# Patient Record
Sex: FEMALE | Race: WHITE | NOT HISPANIC OR LATINO | Employment: FULL TIME | URBAN - METROPOLITAN AREA
[De-identification: names, ages, dates, MRNs, and addresses within clinical notes are randomized per-mention and may not be internally consistent; named-entity substitution may affect disease eponyms.]

---

## 2020-07-02 ENCOUNTER — OFFICE VISIT (OUTPATIENT)
Dept: NEUROSURGERY | Facility: CLINIC | Age: 35
End: 2020-07-02

## 2020-07-02 VITALS
SYSTOLIC BLOOD PRESSURE: 116 MMHG | DIASTOLIC BLOOD PRESSURE: 76 MMHG | WEIGHT: 142 LBS | TEMPERATURE: 98.2 F | HEIGHT: 63 IN | BODY MASS INDEX: 25.16 KG/M2

## 2020-07-02 DIAGNOSIS — M54.42 CHRONIC LEFT-SIDED LOW BACK PAIN WITH LEFT-SIDED SCIATICA: Primary | ICD-10-CM

## 2020-07-02 DIAGNOSIS — G89.29 CHRONIC LEFT-SIDED LOW BACK PAIN WITH LEFT-SIDED SCIATICA: Primary | ICD-10-CM

## 2020-07-02 DIAGNOSIS — Z98.890 S/P DISCECTOMY: ICD-10-CM

## 2020-07-02 PROCEDURE — 99213 OFFICE O/P EST LOW 20 MIN: CPT | Performed by: PHYSICIAN ASSISTANT

## 2020-07-02 RX ORDER — BUPROPION HYDROCHLORIDE 150 MG/1
TABLET ORAL
COMMUNITY
Start: 2020-06-17

## 2020-07-02 RX ORDER — TIZANIDINE 4 MG/1
TABLET ORAL
COMMUNITY
Start: 2020-06-18

## 2020-07-02 RX ORDER — BUSPIRONE HYDROCHLORIDE 15 MG/1
TABLET ORAL
COMMUNITY
Start: 2020-06-08

## 2020-07-02 RX ORDER — MONTELUKAST SODIUM 10 MG/1
TABLET ORAL
COMMUNITY
Start: 2020-06-08

## 2020-07-02 RX ORDER — QUETIAPINE FUMARATE 25 MG/1
TABLET, FILM COATED ORAL
COMMUNITY
Start: 2020-06-21

## 2020-07-02 RX ORDER — METHYLPREDNISOLONE 4 MG/1
TABLET ORAL
Qty: 21 TABLET | Refills: 0 | Status: SHIPPED | OUTPATIENT
Start: 2020-07-02 | End: 2022-07-21

## 2020-07-02 RX ORDER — FLUOXETINE HYDROCHLORIDE 20 MG/1
40 CAPSULE ORAL DAILY
COMMUNITY

## 2020-07-02 NOTE — PATIENT INSTRUCTIONS

## 2020-07-02 NOTE — PROGRESS NOTES
NAME: CRUZ GUTIERREZ   DOS: 2020  : 1985  PCP: Marianne Ruth APRN    Chief Complaint:  Back Pain      History of Present Illness: Ms. Gutierrez is a 35 y.o. female who is a  is seen today with a chief complaint of left hip and posterior thigh pain.  She has a significant past medical history of a discectomy in  by Dr. Pinon.  She notes that her symptoms are similar this time, however, not as severe as prior.  She states the pain has been present for the last several years but have worsened over the last few months.  She describes the pain as constant and throbbing.  She has not completed any physical therapy.  Patient is a current smoker 1 pack/day for the last 13-14 years.  Patient has attempted muscle relaxer without relief and takes ibuprofen sporadically.  Patient denies any bowel or bladder incontinence, saddle anesthesia, or overt weakness.      Past Medical History:   Diagnosis Date   • Arthritis        Past Surgical History:   Procedure Laterality Date   • BACK SURGERY  2018    ruptured disc/[place]/ sitting on nerve root.   • SHOULDER ARTHROSCOPY               Review of Systems   Constitutional: Positive for activity change.   Eyes: Positive for discharge and itching.   Musculoskeletal: Positive for arthralgias, back pain, myalgias and neck pain.   Allergic/Immunologic: Positive for food allergies.   Neurological: Positive for weakness, numbness and headaches.   Psychiatric/Behavioral: Positive for dysphoric mood and sleep disturbance.   All other systems reviewed and are negative.       Medications:    Current Outpatient Medications:   •  FLUoxetine (PROzac) 20 MG capsule, Take 40 mg by mouth Daily., Disp: , Rfl:   •  buPROPion XL (WELLBUTRIN XL) 150 MG 24 hr tablet, , Disp: , Rfl:   •  busPIRone (BUSPAR) 15 MG tablet, , Disp: , Rfl:   •  methylPREDNISolone (MEDROL, FARIDA,) 4 MG tablet, Take as directed on package instructions., Disp: 21 tablet, Rfl: 0  •  montelukast  (SINGULAIR) 10 MG tablet, , Disp: , Rfl:   •  QUEtiapine (SEROquel) 25 MG tablet, , Disp: , Rfl:   •  SPRINTEC 28 0.25-35 MG-MCG per tablet, , Disp: , Rfl:   •  tiZANidine (ZANAFLEX) 4 MG tablet, , Disp: , Rfl:     Allergies:  Allergies   Allergen Reactions   • Delsym [Dextromethorphan Polistirex Er] GI Intolerance   • Red Dye GI Intolerance   • Sulfa Antibiotics GI Intolerance       Social History     Tobacco Use   • Smoking status: Current Every Day Smoker     Packs/day: 1.00   • Smokeless tobacco: Never Used   Substance Use Topics   • Alcohol use: Not on file     Comment: rare   • Drug use: Never       History reviewed. No pertinent family history.    Review of Imaging:  MRI of the lumbar spine without contrast was reviewed along with its corresponding radiology report.  The study is limited due to a prior surgery and was not completed with contrast.  The study demonstrates a small left sided subarticular extrusion at the L5-S1, moderate canal stenosis at L4-5.  There is also evidence of degenerative disc changes at L4-5 and L5-S1.  No evidence of fracture or misalignment.    Vitals:    07/02/20 1317   BP: 116/76   Temp: 98.2 °F (36.8 °C)     Body mass index is 25.15 kg/m².    Physical Exam   Constitutional: She is oriented to person, place, and time. She appears well-developed and well-nourished.   Eyes: Conjunctivae and EOM are normal.   Cardiovascular: Normal rate.   No murmur heard.  Musculoskeletal: Normal range of motion.   Neurological: She is oriented to person, place, and time. She has normal strength. Gait normal.   Reflex Scores:       Tricep reflexes are 2+ on the right side and 2+ on the left side.       Bicep reflexes are 2+ on the right side and 2+ on the left side.       Patellar reflexes are 2+ on the right side and 2+ on the left side.       Achilles reflexes are 2+ on the right side and 2+ on the left side.  Skin: Skin is warm and dry.     Neurologic Exam     Mental Status   Oriented to person,  place, and time.     Cranial Nerves     CN III, IV, VI   Extraocular motions are normal.     Motor Exam   Muscle bulk: normal  Overall muscle tone: normal  Right arm tone: normal  Left arm tone: normal  Right leg tone: normal  Left leg tone: normal    Strength   Strength 5/5 throughout. Hip Rotation L/R Positive      Sensory Exam   Light touch normal.     Gait, Coordination, and Reflexes     Gait  Gait: normal    Reflexes   Right biceps: 2+  Left biceps: 2+  Right triceps: 2+  Left triceps: 2+  Right patellar: 2+  Left patellar: 2+  Right achilles: 2+  Left achilles: 2+  Right Earl: absent  Left Earl: absent  Right ankle clonus: absent  Left ankle clonus: absent      Diagnoses/Plan:    Ms. Carlos is a 35 y.o. female is seen today with left hip pain with left leg posterior radiculopathy.  Reviewing the studies, the study was completed without contrast, but due to patient's prior lumbar discectomy the study interpretation is limited.  After discussion with patient, we will have patient attend physical therapy at this time.  In addition, I prescribed patient a Medrol Dosepak to help with her most recent flareup.  After patient attends physical therapy, will have patient follow-up in office with an MRI with and without contrast and a flexion-extension x-ray for further evaluation.  Patient was understanding of this plan and willing to proceed.  Patient was told to call our office with any questions or concerns in the interim.    Patient's Body mass index is 25.15 kg/m². BMI is above normal parameters. Recommendations include: educational material.     Debbie Carlos  reports that she has been smoking. She has been smoking about 1.00 pack per day. She has never used smokeless tobacco.. I have educated her on the risk of diseases from using tobacco products such as cancer, COPD, heart diease and decrease healing from surgery .     I spent 3  minutes counseling the patient.        Julia Chiu PA-C

## 2022-06-27 ENCOUNTER — OFFICE VISIT (OUTPATIENT)
Dept: FAMILY MEDICINE CLINIC | Facility: CLINIC | Age: 37
End: 2022-06-27

## 2022-06-27 VITALS — HEART RATE: 88 BPM | DIASTOLIC BLOOD PRESSURE: 70 MMHG | SYSTOLIC BLOOD PRESSURE: 130 MMHG

## 2022-06-27 DIAGNOSIS — M54.42 ACUTE BILATERAL LOW BACK PAIN WITH LEFT-SIDED SCIATICA: Primary | ICD-10-CM

## 2022-06-27 PROCEDURE — 99213 OFFICE O/P EST LOW 20 MIN: CPT | Performed by: NURSE PRACTITIONER

## 2022-06-27 NOTE — PROGRESS NOTES
"Chief Complaint  Back Pain (DOI 6/27/22.  This is a work injury from [employer].  She comes in with R mid to lower back pain.  She went to lift a large floor fan and felt a pull in her back.  Incident occurred this morning around 7:30-7:45 am.  She has taken Ibuprofen for pain.  She is having muscle spasms.)    Subjective        Debbie Carlos presents to [facility]  History of Present Illness this is a work injury from [employer].  She went to pull a large floor fan and slightly twisted and hurt her lower back.  She has a hx of low back problems and had surgery in 2017.  She is having mid and lower back pain that radiates slightly down into her left hip and leg area. She took 800mg of ibuprofen 2 hrs. Ago.  She has pain with walking.     Objective   Vital Signs:  /70 (BP Location: Left arm, Patient Position: Sitting, Cuff Size: Adult)   Pulse 88   Estimated body mass index is 25.15 kg/m² as calculated from the following:    Height as of 7/2/20: 160 cm (63\").    Weight as of 7/2/20: 64.4 kg (142 lb).          Physical Exam  Musculoskeletal:      Comments: She has good leg lifts but has some pain with this.  She is able to cautiously get on and off of my table.    Skin:     General: Skin is warm and dry.   Neurological:      Mental Status: She is alert and oriented to person, place, and time.   Psychiatric:         Mood and Affect: Mood normal.         Behavior: Behavior normal.        Result Review :                Assessment and Plan   Diagnoses and all orders for this visit:    1. Acute bilateral low back pain with left-sided sciatica (Primary)  Assessment & Plan:  Light duty:  No lifting greater than 10 lbs, no pushing or pulling.  No climbing or bending and stooping.  She is to continue ibuprofen otc and ice this area and f/u for recheck in one week.              Follow Up   Return in about 2 weeks (around 7/11/2022), or She will be on vacation next week., for Recheck.  Patient was given " no rashes , no jaundice present , good turgor instructions and counseling regarding her condition or for health maintenance advice. Please see specific information pulled into the AVS if appropriate.

## 2022-06-27 NOTE — ASSESSMENT & PLAN NOTE
Light duty:  No lifting greater than 10 lbs, no pushing or pulling.  No climbing or bending and stooping.  She is to continue ibuprofen otc and ice this area and f/u for recheck in one week.

## 2022-07-14 ENCOUNTER — OFFICE VISIT (OUTPATIENT)
Dept: FAMILY MEDICINE CLINIC | Facility: CLINIC | Age: 37
End: 2022-07-14

## 2022-07-14 VITALS
HEART RATE: 77 BPM | WEIGHT: 142.4 LBS | SYSTOLIC BLOOD PRESSURE: 120 MMHG | BODY MASS INDEX: 20.39 KG/M2 | OXYGEN SATURATION: 97 % | HEIGHT: 70 IN | DIASTOLIC BLOOD PRESSURE: 90 MMHG

## 2022-07-14 DIAGNOSIS — M54.42 ACUTE BILATERAL LOW BACK PAIN WITH LEFT-SIDED SCIATICA: Primary | ICD-10-CM

## 2022-07-14 PROCEDURE — 99213 OFFICE O/P EST LOW 20 MIN: CPT | Performed by: NURSE PRACTITIONER

## 2022-07-14 NOTE — ASSESSMENT & PLAN NOTE
Light duty:  No lifting greater than 10 lbs, no pushing or pulling. No over head reaching and no bending and stooping. She is to f/u with me in one week.

## 2022-07-14 NOTE — PROGRESS NOTES
"Chief Complaint  Back Pain (This is a injury recheck from [employer].  Patient states that still has some pain at times.  This happened on 06/27/2022.)    Subjective        Debbie Carlos presents to [facility]  History of Present Illness she still has pain with certain movements.  She has been working light duty. Some improvement on the sciatica.  She has pain with standing a lot. She is taking ibuprofen alternating with Tylenol and using ice regularly.     Objective   Vital Signs:  /90 (BP Location: Left arm, Patient Position: Sitting, Cuff Size: Adult)   Pulse 77   Ht 177.8 cm (70\")   Wt 64.6 kg (142 lb 6.4 oz)   SpO2 97%   BMI 20.43 kg/m²   Estimated body mass index is 20.43 kg/m² as calculated from the following:    Height as of this encounter: 177.8 cm (70\").    Weight as of this encounter: 64.6 kg (142 lb 6.4 oz).    BMI is within normal parameters. No other follow-up for BMI required.      Physical Exam  Musculoskeletal:         General: Normal range of motion.      Comments: She has limited leg lifts on the left and appropriate on the right.  She has pain with these lifts.    Neurological:      Mental Status: She is alert.        Result Review :                Assessment and Plan   Diagnoses and all orders for this visit:    1. Acute bilateral low back pain with left-sided sciatica (Primary)  Assessment & Plan:  Light duty:  No lifting greater than 10 lbs, no pushing or pulling. No over head reaching and no bending and stooping. She is to f/u with me in one week.     Orders:  -     Ambulatory Referral to Physical Therapy Evaluate and treat           Follow Up   Return in about 1 week (around 7/21/2022) for Recheck.  Patient was given instructions and counseling regarding her condition or for health maintenance advice. Please see specific information pulled into the AVS if appropriate.       "

## 2022-07-21 ENCOUNTER — OFFICE VISIT (OUTPATIENT)
Dept: FAMILY MEDICINE CLINIC | Facility: CLINIC | Age: 37
End: 2022-07-21

## 2022-07-21 VITALS
BODY MASS INDEX: 27.4 KG/M2 | DIASTOLIC BLOOD PRESSURE: 80 MMHG | HEART RATE: 88 BPM | WEIGHT: 148.9 LBS | OXYGEN SATURATION: 98 % | SYSTOLIC BLOOD PRESSURE: 134 MMHG | HEIGHT: 62 IN | TEMPERATURE: 98 F

## 2022-07-21 DIAGNOSIS — M54.42 ACUTE BILATERAL LOW BACK PAIN WITH LEFT-SIDED SCIATICA: Primary | ICD-10-CM

## 2022-07-21 PROCEDURE — 99212 OFFICE O/P EST SF 10 MIN: CPT | Performed by: NURSE PRACTITIONER

## 2022-07-21 RX ORDER — BUPRENORPHINE HYDROCHLORIDE AND NALOXONE HYDROCHLORIDE DIHYDRATE 8; 2 MG/1; MG/1
2 TABLET SUBLINGUAL DAILY
COMMUNITY
Start: 2022-07-02

## 2022-07-21 RX ORDER — FLUOXETINE HYDROCHLORIDE 20 MG/1
1 CAPSULE ORAL DAILY
COMMUNITY
End: 2022-07-21 | Stop reason: SDUPTHER

## 2022-07-21 RX ORDER — BUSPIRONE HYDROCHLORIDE 30 MG/1
1 TABLET ORAL 2 TIMES DAILY
COMMUNITY
Start: 2022-07-11 | End: 2022-07-21 | Stop reason: SDUPTHER

## 2022-07-21 RX ORDER — MEDROXYPROGESTERONE ACETATE 150 MG/ML
150 INJECTION, SUSPENSION INTRAMUSCULAR
COMMUNITY

## 2022-07-21 RX ORDER — FLUTICASONE PROPIONATE 50 MCG
1 SPRAY, SUSPENSION (ML) NASAL DAILY
COMMUNITY
Start: 2022-07-11

## 2022-07-21 RX ORDER — PANTOPRAZOLE SODIUM 40 MG/1
1 TABLET, DELAYED RELEASE ORAL DAILY
COMMUNITY
Start: 2022-06-04

## 2022-07-21 RX ORDER — DICLOFENAC SODIUM 30 MG/G
1 GEL TOPICAL AS NEEDED
COMMUNITY
Start: 2022-06-20

## 2022-07-21 NOTE — PROGRESS NOTES
"Chief Complaint  Back Injury (F/u. Pt having pain in L hip radiating down left leg.)    Subjective        Debbie Carlos presents to [facility]  History of Present Illness This is a work injury recheck for [employer]. She has been working light duty but has been working over time. She has seen minimal improvement in her back symptoms. The pain is worse in the left hip. She cannot lay on that side at night.     Objective   Vital Signs:  /80 (BP Location: Left arm, Patient Position: Sitting, Cuff Size: Adult)   Pulse 88   Temp 98 °F (36.7 °C) (Temporal)   Ht 157.5 cm (62\")   Wt 67.5 kg (148 lb 14.4 oz)   SpO2 98%   BMI 27.23 kg/m²   Estimated body mass index is 27.23 kg/m² as calculated from the following:    Height as of this encounter: 157.5 cm (62\").    Weight as of this encounter: 67.5 kg (148 lb 14.4 oz).          Physical Exam   Result Review :                Assessment and Plan   Diagnoses and all orders for this visit:    1. Acute bilateral low back pain with left-sided sciatica (Primary)  Assessment & Plan:  Light duty:  No lifting greater than 10 lbs, no pushing or pulling. No over reaching and no bending and stooping.  She is to only work 40 hrs.per week.      Orders:  -     Ambulatory Referral to Physical Therapy Evaluate and treat           Follow Up   Return in about 1 week (around 7/28/2022) for Recheck.  Patient was given instructions and counseling regarding her condition or for health maintenance advice. Please see specific information pulled into the AVS if appropriate.       "

## 2022-07-21 NOTE — ASSESSMENT & PLAN NOTE
Light duty:  No lifting greater than 10 lbs, no pushing or pulling. No over reaching and no bending and stooping.  She is to only work 40 hrs.per week.

## 2022-07-22 ENCOUNTER — TELEPHONE (OUTPATIENT)
Dept: FAMILY MEDICINE CLINIC | Facility: CLINIC | Age: 37
End: 2022-07-22

## 2022-07-22 NOTE — TELEPHONE ENCOUNTER
HUB OK TO READ  APPOINTMENT FOR PHYSICAL THERAPY 7/27/22 @10:30. IF THIS DATE OR TIME IS NOT CONVENIENT, PLEASE CALL THEIR OFFICE TO RESCHEDULE.  [facility]  [address]  [phone]

## 2022-08-04 ENCOUNTER — OFFICE VISIT (OUTPATIENT)
Dept: FAMILY MEDICINE CLINIC | Facility: CLINIC | Age: 37
End: 2022-08-04

## 2022-08-04 VITALS
OXYGEN SATURATION: 98 % | DIASTOLIC BLOOD PRESSURE: 88 MMHG | BODY MASS INDEX: 26.07 KG/M2 | HEIGHT: 62 IN | HEART RATE: 79 BPM | TEMPERATURE: 97.7 F | WEIGHT: 141.7 LBS | SYSTOLIC BLOOD PRESSURE: 110 MMHG

## 2022-08-04 DIAGNOSIS — M54.42 ACUTE BILATERAL LOW BACK PAIN WITH LEFT-SIDED SCIATICA: Primary | ICD-10-CM

## 2022-08-04 PROCEDURE — 99213 OFFICE O/P EST LOW 20 MIN: CPT | Performed by: NURSE PRACTITIONER

## 2022-08-04 NOTE — ASSESSMENT & PLAN NOTE
Continue light duty restrictions until after finished with PT visits. She is not work work overtime. She will need rest her back.  She is to f/u once done with these visits.  Continue the ibuprofen.

## 2022-08-04 NOTE — PROGRESS NOTES
"Chief Complaint  Back Injury (F/u)    Subjective        Debbie Carlos presents to [facility]  History of Present Illness She has been approved for PT and not started this. She is getting her information together. She has been working light duty since this accident.  She has had some minimal improvement in her symptoms.     Objective   Vital Signs:  /88 (BP Location: Left arm, Patient Position: Sitting, Cuff Size: Adult)   Pulse 79   Temp 97.7 °F (36.5 °C) (Temporal)   Ht 157.5 cm (62\")   Wt 64.3 kg (141 lb 11.2 oz)   SpO2 98%   BMI 25.92 kg/m²   Estimated body mass index is 25.92 kg/m² as calculated from the following:    Height as of this encounter: 157.5 cm (62\").    Weight as of this encounter: 64.3 kg (141 lb 11.2 oz).          Physical Exam  Constitutional:       Appearance: She is obese.   Musculoskeletal:         General: No swelling or tenderness.      Comments: She has some Improvement in her leg lifts and knee lifts.  She is able to get on and off the table well.    Neurological:      Mental Status: She is alert and oriented to person, place, and time.   Psychiatric:         Behavior: Behavior normal.        Result Review :                Assessment and Plan   Diagnoses and all orders for this visit:    1. Acute bilateral low back pain with left-sided sciatica (Primary)  Assessment & Plan:  Continue light duty restrictions until after finished with PT visits. She is not work work overtime. She will need rest her back.  She is to f/u once done with these visits.  Continue the ibuprofen.              Follow Up   Return in about 4 weeks (around 9/1/2022).  Patient was given instructions and counseling regarding her condition or for health maintenance advice. Please see specific information pulled into the AVS if appropriate.       "

## 2022-08-23 ENCOUNTER — TELEPHONE (OUTPATIENT)
Dept: FAMILY MEDICINE CLINIC | Facility: CLINIC | Age: 37
End: 2022-08-23

## 2022-08-23 NOTE — TELEPHONE ENCOUNTER
Caller: Debbie Carlos    Relationship: Self    Best call back number:  [phone]    What form or medical record are you requesting: FORM  113    Who is requesting this form or medical record from you: EMPLOYER     How would you like to receive the form or medical records (pick-up, mail, fax): FAX  If fax, what is the fax number: PATIENT WILL WRITE THE FAX NUMBER DOWN ON THE PAPER SHE FAXES TO US      Timeframe paperwork needed: BY END OF 8/23/22 IF AT ALL POSSIBLE

## 2022-09-13 ENCOUNTER — OFFICE VISIT (OUTPATIENT)
Dept: FAMILY MEDICINE CLINIC | Facility: CLINIC | Age: 37
End: 2022-09-13

## 2022-09-13 VITALS
HEIGHT: 62 IN | BODY MASS INDEX: 26.72 KG/M2 | OXYGEN SATURATION: 98 % | WEIGHT: 145.2 LBS | SYSTOLIC BLOOD PRESSURE: 132 MMHG | DIASTOLIC BLOOD PRESSURE: 88 MMHG | HEART RATE: 87 BPM | TEMPERATURE: 98.6 F

## 2022-09-13 DIAGNOSIS — M54.42 ACUTE BILATERAL LOW BACK PAIN WITH LEFT-SIDED SCIATICA: Primary | ICD-10-CM

## 2022-09-13 PROCEDURE — 99213 OFFICE O/P EST LOW 20 MIN: CPT | Performed by: NURSE PRACTITIONER

## 2022-09-13 RX ORDER — ESCITALOPRAM OXALATE 10 MG/1
10 TABLET ORAL DAILY
COMMUNITY
Start: 2022-08-04

## 2022-09-13 NOTE — PROGRESS NOTES
"Chief Complaint  Back Pain (F/u)    Subjective        Debbie Carlos presents to [facility]  History of Present Illnessthis is a work injury recheck for [employer]. She has been working light duty since this accident.She has been in PT for about 2 weeks and states that this is not helping a lot.  She has more pain with this. She is taking ibuprofen with Tylenol for pain. Her pain is located in the mid thoracic area.     Objective   Vital Signs:  /88 (BP Location: Left arm, Patient Position: Sitting, Cuff Size: Adult)   Pulse 87   Temp 98.6 °F (37 °C) (Temporal)   Ht 157.5 cm (62\")   Wt 65.9 kg (145 lb 3.2 oz)   SpO2 98%   BMI 26.56 kg/m²   Estimated body mass index is 26.56 kg/m² as calculated from the following:    Height as of this encounter: 157.5 cm (62\").    Weight as of this encounter: 65.9 kg (145 lb 3.2 oz).          Physical Exam  Constitutional:       Appearance: She is normal weight.   Skin:     General: Skin is warm and dry.   Neurological:      Mental Status: She is alert.        Result Review :                Assessment and Plan   Diagnoses and all orders for this visit:    1. Acute bilateral low back pain with left-sided sciatica (Primary)  Assessment & Plan:  Continue and finish all of PT visits.  She is to continue light duty: No lifting greater than 10 lbs, no pushing and pulling and no over head reaching. She is to f/u with me once all PT visits are finished.              Follow Up   Return in about 3 weeks (around 10/4/2022).  Patient was given instructions and counseling regarding her condition or for health maintenance advice. Please see specific information pulled into the AVS if appropriate.       "

## 2022-09-13 NOTE — ASSESSMENT & PLAN NOTE
Continue and finish all of PT visits.  She is to continue light duty: No lifting greater than 10 lbs, no pushing and pulling and no over head reaching. She is to f/u with me once all PT visits are finished.

## 2022-09-23 ENCOUNTER — TELEPHONE (OUTPATIENT)
Dept: FAMILY MEDICINE CLINIC | Facility: CLINIC | Age: 37
End: 2022-09-23

## 2022-09-23 NOTE — TELEPHONE ENCOUNTER
[facility]) would like a call back.  She said the patient informed her employer that she is unable to get physical therapy because worker's comp has not approved it. Ld said they have not received anything.  She would like a call back. Ph: [phone]. Claim# [id number]

## 2024-10-08 ENCOUNTER — APPOINTMENT (OUTPATIENT)
Dept: GENERAL RADIOLOGY | Facility: HOSPITAL | Age: 39
End: 2024-10-08
Payer: COMMERCIAL

## 2024-10-08 ENCOUNTER — HOSPITAL ENCOUNTER (EMERGENCY)
Facility: HOSPITAL | Age: 39
Discharge: HOME | End: 2024-10-08
Attending: STUDENT IN AN ORGANIZED HEALTH CARE EDUCATION/TRAINING PROGRAM | Admitting: STUDENT IN AN ORGANIZED HEALTH CARE EDUCATION/TRAINING PROGRAM
Payer: COMMERCIAL

## 2024-10-08 VITALS
DIASTOLIC BLOOD PRESSURE: 89 MMHG | OXYGEN SATURATION: 97 % | TEMPERATURE: 97.9 F | SYSTOLIC BLOOD PRESSURE: 121 MMHG | WEIGHT: 128 LBS | HEIGHT: 63 IN | HEART RATE: 93 BPM | RESPIRATION RATE: 16 BRPM | BODY MASS INDEX: 22.68 KG/M2

## 2024-10-08 DIAGNOSIS — R79.89 POSITIVE D DIMER: ICD-10-CM

## 2024-10-08 DIAGNOSIS — F19.10 POLYSUBSTANCE ABUSE: ICD-10-CM

## 2024-10-08 DIAGNOSIS — R79.89 LOW T4: ICD-10-CM

## 2024-10-08 DIAGNOSIS — N39.0 ACUTE UTI (URINARY TRACT INFECTION): ICD-10-CM

## 2024-10-08 DIAGNOSIS — M79.89 LEG SWELLING: Primary | ICD-10-CM

## 2024-10-08 LAB
ALBUMIN SERPL-MCNC: 4.2 G/DL (ref 3.5–5.2)
ALBUMIN/GLOB SERPL: 1.9 G/DL
ALP SERPL-CCNC: 70 U/L (ref 39–117)
ALT SERPL W P-5'-P-CCNC: 29 U/L (ref 1–33)
AMPHET+METHAMPHET UR QL: POSITIVE
AMPHETAMINES UR QL: POSITIVE
ANION GAP SERPL CALCULATED.3IONS-SCNC: 9 MMOL/L (ref 5–15)
APAP SERPL-MCNC: <5 MCG/ML (ref 0–30)
AST SERPL-CCNC: 30 U/L (ref 1–32)
B-HCG UR QL: NEGATIVE
BACTERIA UR QL AUTO: ABNORMAL /HPF
BARBITURATES UR QL SCN: NEGATIVE
BASOPHILS # BLD AUTO: 0.05 10*3/MM3 (ref 0–0.2)
BASOPHILS NFR BLD AUTO: 0.4 % (ref 0–1.5)
BENZODIAZ UR QL SCN: NEGATIVE
BILIRUB SERPL-MCNC: 0.2 MG/DL (ref 0–1.2)
BILIRUB UR QL STRIP: NEGATIVE
BUN SERPL-MCNC: 7 MG/DL (ref 6–20)
BUN/CREAT SERPL: 8 (ref 7–25)
BUPRENORPHINE SERPL-MCNC: NEGATIVE NG/ML
CALCIUM SPEC-SCNC: 9.4 MG/DL (ref 8.6–10.5)
CANNABINOIDS SERPL QL: NEGATIVE
CHLORIDE SERPL-SCNC: 103 MMOL/L (ref 98–107)
CLARITY UR: CLEAR
CO2 SERPL-SCNC: 28 MMOL/L (ref 22–29)
COCAINE UR QL: NEGATIVE
COLOR UR: YELLOW
CREAT SERPL-MCNC: 0.88 MG/DL (ref 0.57–1)
D DIMER PPP FEU-MCNC: 0.8 MCGFEU/ML (ref 0–0.5)
D-LACTATE SERPL-SCNC: 1.8 MMOL/L (ref 0.5–2)
DEPRECATED RDW RBC AUTO: 42.5 FL (ref 37–54)
EGFRCR SERPLBLD CKD-EPI 2021: 86.9 ML/MIN/1.73
EOSINOPHIL # BLD AUTO: 0.11 10*3/MM3 (ref 0–0.4)
EOSINOPHIL NFR BLD AUTO: 0.9 % (ref 0.3–6.2)
ERYTHROCYTE [DISTWIDTH] IN BLOOD BY AUTOMATED COUNT: 12.6 % (ref 12.3–15.4)
ETHANOL BLD-MCNC: <10 MG/DL (ref 0–10)
FENTANYL UR-MCNC: POSITIVE NG/ML
FLUAV RNA RESP QL NAA+PROBE: NOT DETECTED
FLUBV RNA RESP QL NAA+PROBE: NOT DETECTED
GLOBULIN UR ELPH-MCNC: 2.2 GM/DL
GLUCOSE SERPL-MCNC: 79 MG/DL (ref 65–99)
GLUCOSE UR STRIP-MCNC: NEGATIVE MG/DL
HCT VFR BLD AUTO: 35.5 % (ref 34–46.6)
HGB BLD-MCNC: 11.9 G/DL (ref 12–15.9)
HGB UR QL STRIP.AUTO: NEGATIVE
HYALINE CASTS UR QL AUTO: ABNORMAL /LPF
IMM GRANULOCYTES # BLD AUTO: 0.05 10*3/MM3 (ref 0–0.05)
IMM GRANULOCYTES NFR BLD AUTO: 0.4 % (ref 0–0.5)
KETONES UR QL STRIP: NEGATIVE
LEUKOCYTE ESTERASE UR QL STRIP.AUTO: ABNORMAL
LYMPHOCYTES # BLD AUTO: 2.2 10*3/MM3 (ref 0.7–3.1)
LYMPHOCYTES NFR BLD AUTO: 18.9 % (ref 19.6–45.3)
MAGNESIUM SERPL-MCNC: 2.2 MG/DL (ref 1.6–2.6)
MCH RBC QN AUTO: 30.9 PG (ref 26.6–33)
MCHC RBC AUTO-ENTMCNC: 33.5 G/DL (ref 31.5–35.7)
MCV RBC AUTO: 92.2 FL (ref 79–97)
METHADONE UR QL SCN: NEGATIVE
MONOCYTES # BLD AUTO: 0.93 10*3/MM3 (ref 0.1–0.9)
MONOCYTES NFR BLD AUTO: 8 % (ref 5–12)
NEUTROPHILS NFR BLD AUTO: 71.4 % (ref 42.7–76)
NEUTROPHILS NFR BLD AUTO: 8.27 10*3/MM3 (ref 1.7–7)
NITRITE UR QL STRIP: NEGATIVE
NRBC BLD AUTO-RTO: 0 /100 WBC (ref 0–0.2)
NT-PROBNP SERPL-MCNC: 211.8 PG/ML (ref 0–450)
OPIATES UR QL: POSITIVE
OXYCODONE UR QL SCN: NEGATIVE
PCP UR QL SCN: NEGATIVE
PH UR STRIP.AUTO: 7 [PH] (ref 5–8)
PHOSPHATE SERPL-MCNC: 2.6 MG/DL (ref 2.5–4.5)
PLATELET # BLD AUTO: 356 10*3/MM3 (ref 140–450)
PMV BLD AUTO: 9.7 FL (ref 6–12)
POTASSIUM SERPL-SCNC: 3.5 MMOL/L (ref 3.5–5.2)
PROT SERPL-MCNC: 6.4 G/DL (ref 6–8.5)
PROT UR QL STRIP: NEGATIVE
RBC # BLD AUTO: 3.85 10*6/MM3 (ref 3.77–5.28)
RBC # UR STRIP: ABNORMAL /HPF
REF LAB TEST METHOD: ABNORMAL
RSV RNA RESP QL NAA+PROBE: NOT DETECTED
SALICYLATES SERPL-MCNC: <0.3 MG/DL
SARS-COV-2 RNA RESP QL NAA+PROBE: NOT DETECTED
SODIUM SERPL-SCNC: 140 MMOL/L (ref 136–145)
SP GR UR STRIP: 1.01 (ref 1–1.03)
SQUAMOUS #/AREA URNS HPF: ABNORMAL /HPF
T4 FREE SERPL-MCNC: 0.83 NG/DL (ref 0.92–1.68)
TRICYCLICS UR QL SCN: POSITIVE
TROPONIN T SERPL HS-MCNC: <6 NG/L
TSH SERPL DL<=0.05 MIU/L-ACNC: 0.96 UIU/ML (ref 0.27–4.2)
UROBILINOGEN UR QL STRIP: ABNORMAL
WBC # UR STRIP: ABNORMAL /HPF
WBC NRBC COR # BLD AUTO: 11.61 10*3/MM3 (ref 3.4–10.8)

## 2024-10-08 PROCEDURE — 87637 SARSCOV2&INF A&B&RSV AMP PRB: CPT | Performed by: STUDENT IN AN ORGANIZED HEALTH CARE EDUCATION/TRAINING PROGRAM

## 2024-10-08 PROCEDURE — 80143 DRUG ASSAY ACETAMINOPHEN: CPT | Performed by: STUDENT IN AN ORGANIZED HEALTH CARE EDUCATION/TRAINING PROGRAM

## 2024-10-08 PROCEDURE — 85379 FIBRIN DEGRADATION QUANT: CPT | Performed by: STUDENT IN AN ORGANIZED HEALTH CARE EDUCATION/TRAINING PROGRAM

## 2024-10-08 PROCEDURE — 81025 URINE PREGNANCY TEST: CPT | Performed by: STUDENT IN AN ORGANIZED HEALTH CARE EDUCATION/TRAINING PROGRAM

## 2024-10-08 PROCEDURE — 85025 COMPLETE CBC W/AUTO DIFF WBC: CPT | Performed by: STUDENT IN AN ORGANIZED HEALTH CARE EDUCATION/TRAINING PROGRAM

## 2024-10-08 PROCEDURE — 80307 DRUG TEST PRSMV CHEM ANLYZR: CPT | Performed by: STUDENT IN AN ORGANIZED HEALTH CARE EDUCATION/TRAINING PROGRAM

## 2024-10-08 PROCEDURE — 84484 ASSAY OF TROPONIN QUANT: CPT | Performed by: STUDENT IN AN ORGANIZED HEALTH CARE EDUCATION/TRAINING PROGRAM

## 2024-10-08 PROCEDURE — 80179 DRUG ASSAY SALICYLATE: CPT | Performed by: STUDENT IN AN ORGANIZED HEALTH CARE EDUCATION/TRAINING PROGRAM

## 2024-10-08 PROCEDURE — 36415 COLL VENOUS BLD VENIPUNCTURE: CPT

## 2024-10-08 PROCEDURE — 83880 ASSAY OF NATRIURETIC PEPTIDE: CPT | Performed by: STUDENT IN AN ORGANIZED HEALTH CARE EDUCATION/TRAINING PROGRAM

## 2024-10-08 PROCEDURE — 93005 ELECTROCARDIOGRAM TRACING: CPT | Performed by: STUDENT IN AN ORGANIZED HEALTH CARE EDUCATION/TRAINING PROGRAM

## 2024-10-08 PROCEDURE — 80053 COMPREHEN METABOLIC PANEL: CPT | Performed by: STUDENT IN AN ORGANIZED HEALTH CARE EDUCATION/TRAINING PROGRAM

## 2024-10-08 PROCEDURE — 84439 ASSAY OF FREE THYROXINE: CPT | Performed by: STUDENT IN AN ORGANIZED HEALTH CARE EDUCATION/TRAINING PROGRAM

## 2024-10-08 PROCEDURE — 84100 ASSAY OF PHOSPHORUS: CPT | Performed by: STUDENT IN AN ORGANIZED HEALTH CARE EDUCATION/TRAINING PROGRAM

## 2024-10-08 PROCEDURE — 83735 ASSAY OF MAGNESIUM: CPT | Performed by: STUDENT IN AN ORGANIZED HEALTH CARE EDUCATION/TRAINING PROGRAM

## 2024-10-08 PROCEDURE — 83605 ASSAY OF LACTIC ACID: CPT | Performed by: STUDENT IN AN ORGANIZED HEALTH CARE EDUCATION/TRAINING PROGRAM

## 2024-10-08 PROCEDURE — 81001 URINALYSIS AUTO W/SCOPE: CPT | Performed by: STUDENT IN AN ORGANIZED HEALTH CARE EDUCATION/TRAINING PROGRAM

## 2024-10-08 PROCEDURE — 71045 X-RAY EXAM CHEST 1 VIEW: CPT

## 2024-10-08 PROCEDURE — 99284 EMERGENCY DEPT VISIT MOD MDM: CPT

## 2024-10-08 PROCEDURE — 84443 ASSAY THYROID STIM HORMONE: CPT | Performed by: STUDENT IN AN ORGANIZED HEALTH CARE EDUCATION/TRAINING PROGRAM

## 2024-10-08 PROCEDURE — 82077 ASSAY SPEC XCP UR&BREATH IA: CPT | Performed by: STUDENT IN AN ORGANIZED HEALTH CARE EDUCATION/TRAINING PROGRAM

## 2024-10-08 RX ORDER — BUMETANIDE 0.5 MG/1
0.5 TABLET ORAL DAILY
Qty: 5 TABLET | Refills: 0 | Status: SHIPPED | OUTPATIENT
Start: 2024-10-08 | End: 2024-10-13

## 2024-10-08 RX ORDER — NITROFURANTOIN 25; 75 MG/1; MG/1
100 CAPSULE ORAL 2 TIMES DAILY
Qty: 14 CAPSULE | Refills: 0 | Status: SHIPPED | OUTPATIENT
Start: 2024-10-08

## 2024-10-08 NOTE — DISCHARGE INSTRUCTIONS
If you have a Duplex Venous study ordered and have not received a phone call to schedule this test by 10:00 am tomorrow, please call.    [phone] (Monday - Friday)    [phone] (Weekends)      Take the provided antibiotic as directed I would recommend reattempting compression stockings and avoiding stimulants like methamphetamine prior to taking the provided diuretic medicine.  Follow-up with your PCP.  While today's workup was reassuring if symptoms change or worsen please return to the ED or seek other medical care.

## 2024-10-08 NOTE — ED PROVIDER NOTES
EMERGENCY DEPARTMENT ENCOUNTER    Pt Name: Debbie Carlos  MRN: [id number]  Pt :   1985  Room Number:  15/15  Date of encounter:  10/8/2024  PCP: Marianne Ruth APRN  ED Provider: Chance Davis MD    Historian: Patient      HPI:  Chief Complaint: Leg swelling        Context: Debbie Carlos is a 39-year-old female who presents the emergency department because of 5 days of symmetric leg swelling that she is describing is painful she says she first noticed it 5 days ago she has had this happen twice before and it resolved on its own but she is never been evaluated for it.  The swelling does get worse at the end of the day if she has been on her feet a lot and does get better when her feet are elevated.  She has not appreciated any fevers or systemic symptoms denies chest pain or palpitations but does says she intermittently gets bilateral arm and hand tingling she is not currently having that symptom.  Denies pregnancy.  Denies drugs or alcohol.  Knows of no other medical issues.  No other complaints at this time.      PAST MEDICAL HISTORY  Past Medical History:   Diagnosis Date    Anxiety     Arthritis     Backache     GERD (gastroesophageal reflux disease)          PAST SURGICAL HISTORY  Past Surgical History:   Procedure Laterality Date    BACK SURGERY  2018    ruptured disc/[place]/ sitting on nerve root.    SHOULDER ARTHROSCOPY  2015         FAMILY HISTORY  Family History   Family history unknown: Yes         SOCIAL HISTORY  Social History     Socioeconomic History    Marital status: Single   Tobacco Use    Smoking status: Every Day     Current packs/day: 1.00     Average packs/day: 1 pack/day for 15.0 years (15.0 ttl pk-yrs)     Types: Cigarettes    Smokeless tobacco: Never   Vaping Use    Vaping status: Every Day    Substances: Nicotine, Flavoring    Devices: Refillable tank   Substance and Sexual Activity    Alcohol use: Not Currently     Comment: rare    Drug use: Yes     Comment: snorted  heroin; had been sober x2 years, but recently relapsed; last use 7 October 2024    Sexual activity: Yes         ALLERGIES  Delsym [dextromethorphan polistirex er], Red dye #40 (allura red), and Sulfa antibiotics        REVIEW OF SYSTEMS  Review of Systems       All systems reviewed and negative except for those discussed in HPI.       PHYSICAL EXAM    I have reviewed the triage vital signs and nursing notes.    ED Triage Vitals [10/08/24 1632]   Temp Heart Rate Resp BP SpO2   97.9 °F (36.6 °C) (!) 125 16 124/87 100 %      Temp src Heart Rate Source Patient Position BP Location FiO2 (%)   Oral Monitor Sitting Left arm --       Physical Exam  GENERAL:   Appears in no acute distress.   HENT: Nares patent.  EYES: No scleral icterus.  CV: Regular rhythm, regular rate.  RESPIRATORY: Normal effort.  No audible wheezes, rales or rhonchi.  ABDOMEN: Soft, nontender  MUSCULOSKELETAL: No deformities.  Symmetric mild nonpitting lower extremity edema most pronounced around the bilateral malleoli  NEURO: Alert, moves all extremities, follows commands.  SKIN: Warm, dry, acne and eczematous changes skin change      LAB RESULTS  Recent Results (from the past 24 hour(s))   Comprehensive Metabolic Panel    Collection Time: 10/08/24  4:57 PM    Specimen: Arm, Left; Blood   Result Value Ref Range    Glucose 79 65 - 99 mg/dL    BUN 7 6 - 20 mg/dL    Creatinine 0.88 0.57 - 1.00 mg/dL    Sodium 140 136 - 145 mmol/L    Potassium 3.5 3.5 - 5.2 mmol/L    Chloride 103 98 - 107 mmol/L    CO2 28.0 22.0 - 29.0 mmol/L    Calcium 9.4 8.6 - 10.5 mg/dL    Total Protein 6.4 6.0 - 8.5 g/dL    Albumin 4.2 3.5 - 5.2 g/dL    ALT (SGPT) 29 1 - 33 U/L    AST (SGOT) 30 1 - 32 U/L    Alkaline Phosphatase 70 39 - 117 U/L    Total Bilirubin 0.2 0.0 - 1.2 mg/dL    Globulin 2.2 gm/dL    A/G Ratio 1.9 g/dL    BUN/Creatinine Ratio 8.0 7.0 - 25.0    Anion Gap 9.0 5.0 - 15.0 mmol/L    eGFR 86.9 >60.0 mL/min/1.73   BNP    Collection Time: 10/08/24  4:57 PM    Specimen:  Arm, Left; Blood   Result Value Ref Range    proBNP 211.8 0.0 - 450.0 pg/mL   Single High Sensitivity Troponin T    Collection Time: 10/08/24  4:57 PM    Specimen: Arm, Left; Blood   Result Value Ref Range    HS Troponin T <6 <14 ng/L   Lactic Acid, Plasma    Collection Time: 10/08/24  4:57 PM    Specimen: Arm, Left; Blood   Result Value Ref Range    Lactate 1.8 0.5 - 2.0 mmol/L   Magnesium    Collection Time: 10/08/24  4:57 PM    Specimen: Arm, Left; Blood   Result Value Ref Range    Magnesium 2.2 1.6 - 2.6 mg/dL   Phosphorus    Collection Time: 10/08/24  4:57 PM    Specimen: Arm, Left; Blood   Result Value Ref Range    Phosphorus 2.6 2.5 - 4.5 mg/dL   TSH    Collection Time: 10/08/24  4:57 PM    Specimen: Arm, Left; Blood   Result Value Ref Range    TSH 0.964 0.270 - 4.200 uIU/mL   T4, Free    Collection Time: 10/08/24  4:57 PM    Specimen: Arm, Left; Blood   Result Value Ref Range    Free T4 0.83 (L) 0.92 - 1.68 ng/dL   Salicylate Level    Collection Time: 10/08/24  4:57 PM    Specimen: Arm, Left; Blood   Result Value Ref Range    Salicylate <0.3 <=30.0 mg/dL   Ethanol    Collection Time: 10/08/24  4:57 PM    Specimen: Arm, Left; Blood   Result Value Ref Range    Ethanol <10 0 - 10 mg/dL   Acetaminophen Level    Collection Time: 10/08/24  4:57 PM    Specimen: Arm, Left; Blood   Result Value Ref Range    Acetaminophen <5.0 0.0 - 30.0 mcg/mL   D-dimer, Quantitative    Collection Time: 10/08/24  4:57 PM    Specimen: Arm, Left; Blood   Result Value Ref Range    D-Dimer, Quantitative 0.80 (H) 0.00 - 0.50 MCGFEU/mL   CBC Auto Differential    Collection Time: 10/08/24  4:57 PM    Specimen: Arm, Left; Blood   Result Value Ref Range    WBC 11.61 (H) 3.40 - 10.80 10*3/mm3    RBC 3.85 3.77 - 5.28 10*6/mm3    Hemoglobin 11.9 (L) 12.0 - 15.9 g/dL    Hematocrit 35.5 34.0 - 46.6 %    MCV 92.2 79.0 - 97.0 fL    MCH 30.9 26.6 - 33.0 pg    MCHC 33.5 31.5 - 35.7 g/dL    RDW 12.6 12.3 - 15.4 %    RDW-SD 42.5 37.0 - 54.0 fl    MPV  9.7 6.0 - 12.0 fL    Platelets 356 140 - 450 10*3/mm3    Neutrophil % 71.4 42.7 - 76.0 %    Lymphocyte % 18.9 (L) 19.6 - 45.3 %    Monocyte % 8.0 5.0 - 12.0 %    Eosinophil % 0.9 0.3 - 6.2 %    Basophil % 0.4 0.0 - 1.5 %    Immature Grans % 0.4 0.0 - 0.5 %    Neutrophils, Absolute 8.27 (H) 1.70 - 7.00 10*3/mm3    Lymphocytes, Absolute 2.20 0.70 - 3.10 10*3/mm3    Monocytes, Absolute 0.93 (H) 0.10 - 0.90 10*3/mm3    Eosinophils, Absolute 0.11 0.00 - 0.40 10*3/mm3    Basophils, Absolute 0.05 0.00 - 0.20 10*3/mm3    Immature Grans, Absolute 0.05 0.00 - 0.05 10*3/mm3    nRBC 0.0 0.0 - 0.2 /100 WBC   COVID-19, FLU A/B, RSV PCR 1 HR TAT - Swab, Nasopharynx    Collection Time: 10/08/24  5:04 PM    Specimen: Nasopharynx; Swab   Result Value Ref Range    COVID19 Not Detected Not Detected - Ref. Range    Influenza A PCR Not Detected Not Detected    Influenza B PCR Not Detected Not Detected    RSV, PCR Not Detected Not Detected   Urinalysis With Microscopic If Indicated (No Culture) - Urine, Clean Catch    Collection Time: 10/08/24  5:05 PM    Specimen: Urine, Clean Catch   Result Value Ref Range    Color, UA Yellow Yellow, Straw    Appearance, UA Clear Clear    pH, UA 7.0 5.0 - 8.0    Specific Gravity, UA 1.012 1.001 - 1.030    Glucose, UA Negative Negative    Ketones, UA Negative Negative    Bilirubin, UA Negative Negative    Blood, UA Negative Negative    Protein, UA Negative Negative    Leuk Esterase, UA Moderate (2+) (A) Negative    Nitrite, UA Negative Negative    Urobilinogen, UA 0.2 E.U./dL 0.2 - 1.0 E.U./dL   Pregnancy, Urine - Urine, Clean Catch    Collection Time: 10/08/24  5:05 PM    Specimen: Urine, Clean Catch   Result Value Ref Range    HCG, Urine QL Negative Negative   Urine Drug Screen - Urine, Clean Catch    Collection Time: 10/08/24  5:05 PM    Specimen: Urine, Clean Catch   Result Value Ref Range    THC, Screen, Urine Negative Negative    Phencyclidine (PCP), Urine Negative Negative    Cocaine Screen, Urine  Negative Negative    Methamphetamine, Ur Positive (A) Negative    Opiate Screen Positive (A) Negative    Amphetamine Screen, Urine Positive (A) Negative    Benzodiazepine Screen, Urine Negative Negative    Tricyclic Antidepressants Screen Positive (A) Negative    Methadone Screen, Urine Negative Negative    Barbiturates Screen, Urine Negative Negative    Oxycodone Screen, Urine Negative Negative    Buprenorphine, Screen, Urine Negative Negative   Fentanyl, Urine - Urine, Clean Catch    Collection Time: 10/08/24  5:05 PM    Specimen: Urine, Clean Catch   Result Value Ref Range    Fentanyl, Urine Positive (A) Negative   Urinalysis, Microscopic Only - Urine, Clean Catch    Collection Time: 10/08/24  5:05 PM    Specimen: Urine, Clean Catch   Result Value Ref Range    RBC, UA 0-2 None Seen, 0-2 /HPF    WBC, UA 11-20 (A) None Seen, 0-2 /HPF    Bacteria, UA 1+ (A) None Seen, Trace /HPF    Squamous Epithelial Cells, UA 7-12 (A) None Seen, 0-2 /HPF    Hyaline Casts, UA 0-6 0 - 6 /LPF    Methodology Automated Microscopy    ECG 12 Lead Electrolyte Imbalance    Collection Time: 10/08/24  5:08 PM   Result Value Ref Range    QT Interval 350 ms    QTC Interval 451 ms       If labs were ordered, I independently reviewed the results and considered them in treating the patient.        RADIOLOGY  XR Chest 1 View    Result Date: 10/8/2024  XR CHEST 1 VW Date of Exam: 10/8/2024 6:42 PM EDT Indication: bl leg swelling, eval for cardiomegaly Comparison: None available Findings: The cardiomediastinal silhouette is within normal limits. Pulmonary vascularity appears normal. There is no focal airspace consolidation, pleural effusion, or pneumothorax. There are no acute osseous findings of the chest.     Impression: 1. No acute cardiopulmonary abnormality. Electronically Signed: Eric Simonselor  10/8/2024 7:26 PM EDT  Workstation ID: [id number]     I ordered and independently reviewed the above noted radiographic studies.      I viewed images  of chest x-ray which showed no acute pathology that I can appreciate per my independent interpretation.    See radiologist's dictation for official interpretation.        PROCEDURES    Procedures    ECG 12 Lead Electrolyte Imbalance   Preliminary Result   Test Reason : Electrolyte Imbalance   Blood Pressure :   */*   mmHG   Vent. Rate : 100 BPM     Atrial Rate : 100 BPM      P-R Int : 128 ms          QRS Dur :  70 ms       QT Int : 350 ms       P-R-T Axes :  71  59  33 degrees      QTc Int : 451 ms      Normal sinus rhythm   Normal ECG   No previous ECGs available      Referred By:            Confirmed By:           MEDICATIONS GIVEN IN ER    Medications - No data to display      MEDICAL DECISION MAKING, PROGRESS, and CONSULTS    All labs, if obtained, have been independently reviewed by me.  All radiology studies, if obtained, have been reviewed by me and the radiologist dictating the report.  All EKG's, if obtained, have been independently viewed and interpreted by me/my attending physician.      Discussion below represents my analysis of pertinent findings related to patient's condition, differential diagnosis, treatment plan and final disposition.                         Differential diagnosis:    Anemia, electrolyte abnormality, renal failure, liver failure, heart failure, sepsis,   Additional sources:    - Discussed/ obtained information from independent historians:      - External (non-ED) record review:  Chart review shows history of treatment with Suboxone, Seroquel, Wellbutrin, Lexapro, patient says she is currently only taking lisinopril.     - Chronic or social conditions impacting care: Hypertension    - Shared decision making: Patient/patient representative in complete agreement with current plans for evaluation and management.      Orders placed during this visit:  Orders Placed This Encounter   Procedures    COVID PRE-OP / PRE-PROCEDURE SCREENING ORDER (NO ISOLATION) - Swab, Nasopharynx    COVID-19,  FLU A/B, RSV PCR 1 HR TAT - Swab, Nasopharynx    XR Chest 1 View    Comprehensive Metabolic Panel    Urinalysis With Microscopic If Indicated (No Culture) - Urine, Clean Catch    Pregnancy, Urine - Urine, Clean Catch    BNP    Single High Sensitivity Troponin T    Lactic Acid, Plasma    Magnesium    Phosphorus    TSH    T4, Free    Salicylate Level    Urine Drug Screen - Urine, Clean Catch    Ethanol    Acetaminophen Level    D-dimer, Quantitative    CBC Auto Differential    Fentanyl, Urine - Urine, Clean Catch    Urinalysis, Microscopic Only - Urine, Clean Catch    Can PO  Misc Nursing Order (Specify)    ECG 12 Lead Electrolyte Imbalance    CBC & Differential         Additional orders considered but not ordered:      ED Course:    Consultants:      ED Course as of 10/08/24 1931   Tue Oct 08, 2024   1650 Chart review shows history of treatment with Suboxone, Seroquel, Wellbutrin, Lexapro, patient says she is currently only taking lisinopril. [CC]   1652 This is a 39-year-old female who presents the emergency department because of 5 days of symmetric leg swelling that she is describing is painful she says she first noticed it 5 days ago she has had this happen twice before and it resolved on its own but she is never been evaluated for it.  The swelling does get worse at the end of the day if she has been on her feet a lot and does get better when her feet are elevated.  She has not appreciated any fevers or systemic symptoms denies chest pain or palpitations but does says she intermittently gets bilateral arm and hand tingling she is not currently having that symptom.  Denies pregnancy.  Denies drugs or alcohol.  Knows of no other medical issues.  No other complaints at this time. [CC]   1651 She arrived awake and alert initially mildly tachycardic in triage we will continue to monitor otherwise vitals are within normal limits exam shows symmetric nonpitting mild lower extremity edema she does have a small spot of  eczema but otherwise no overlying erythema warmth or rashes.  Concern for heart failure renal failure, cirrhosis, hypothyroidism, anemia, electrolyte abnormality etc. obtaining basic laboratory workup.  Will reevaluate pending initial workup. [CC]   1739 CBC reassuring and nonactionable very mild leukocytosis and very mild anemia hemoglobin 11.9 [CC]   1740 Metabolic panel with no abnormalities normal electrolytes and renal function  D-dimer is slightly elevated at 0.8, with her leg tenderness I cannot rule out DVT, unfortunately it is after hours and I do not have vascular ultrasound overnight we will need to refer her for vascular ultrasound because the swelling is symmetric my concern for DVT is low enough I do not think the single dose of Lovenox we would given these cases is worth the risk.  No elevation in proBNP or lactic acid pointing away from heart failure  Normal TSH and very mildly depressed free T4 at 0.83 I do not think this is abnormal enough to explain her symptoms but will need continued monitoring.  Pregnancy test is negative urinalysis is positive for methamphetamine, opiates, fentanyl and TCAs I think the amphetamines could certainly be contributing to the leg swelling she does appear to have a urinary tract infection with gross pyuria and bacteria there are some contaminating squames but we will treat as UTI. [CC]   1742 ECG heart rate has decreased to 100 from the initial 125 in triage no other abnormality. [CC]   1916 Viral panel negative. [CC]   1916 Chest x-ray shows no acute pathology formal overread still pending. [CC]   1930 We have discussed the workup at length we will treat for urinary tract infection.  We discussed the slightly low free T4 she says it has been like this before she gets that checked by her primary and she will continue that monitoring.  We discussed the elevated D-dimer and getting duplex in the morning.  We discussed at length the risk of the polysubstance abuse and  how I think in particular the methamphetamine is contributing to her leg swelling.  Providing her Bumex if needed but I am recommending cessation of the methamphetamine and compression stockings as a first attempt she is agreeable to this plan.  Discharged in stable condition with strict return precautions. [CC]      ED Course User Index  [CC] Chance Davis MD              Shared Decision Making:  After my consideration of clinical presentation and any laboratory/radiology studies obtained, I discussed the findings with the patient/patient representative who is in agreement with the treatment plan and the final disposition.   Risks and benefits of discharge and/or observation/admission were discussed.       AS OF 19:31 EDT VITALS:    BP - 117/77  HR - 93  TEMP - 97.9 °F (36.6 °C) (Oral)  O2 SATS - 97%      MAGDY reviewed by Chance Davis MD           DIAGNOSIS  Final diagnoses:   Leg swelling   Acute UTI (urinary tract infection)   Polysubstance abuse   Positive D dimer   Low T4         DISPOSITION  DISCHARGE    Patient discharged in stable condition.    Reviewed implications of results, diagnosis, meds, responsibility to follow up, warning signs and symptoms of possible worsening, potential complications and reasons to return to ER.    Patient/Family voiced understanding of above instructions.    Discussed plan for discharge, as there is no emergent indication for admission.  Pt/family is agreeable and understands need for follow up and possible repeat testing.  Pt/family is aware that discharge does not mean that nothing is wrong but that it indicates no emergency is currently present that requires admission and they must continue care with follow-up as given below or with a physician of their choice.     FOLLOW-UP  Marianne Ruth, APRN  [address] [place], [place] [address] [phone]    Call            Medication List        New Prescriptions      bumetanide 0.5 MG tablet  Commonly  known as: BUMEX  Take 1 tablet by mouth Daily for 5 days.     nitrofurantoin (macrocrystal-monohydrate) 100 MG capsule  Commonly known as: MACROBID  Take 1 capsule by mouth 2 (Two) Times a Day.               Where to Get Your Medications        These medications were sent to [id number] - [place], [place] - [facility] [address] [facility] & [address] [facility] - [phone]  - [phone] [place] [address] [address] [facility] [address]      Phone: [phone]   bumetanide 0.5 MG tablet  nitrofurantoin (macrocrystal-monohydrate) 100 MG capsule             Please note that portions of this document were completed with voice recognition software.        Chance Davis MD  10/08/24 1931

## 2024-10-14 LAB
QT INTERVAL: 350 MS
QTC INTERVAL: 451 MS